# Patient Record
Sex: MALE | Race: WHITE | NOT HISPANIC OR LATINO | Employment: FULL TIME | URBAN - METROPOLITAN AREA
[De-identification: names, ages, dates, MRNs, and addresses within clinical notes are randomized per-mention and may not be internally consistent; named-entity substitution may affect disease eponyms.]

---

## 2023-11-16 ENCOUNTER — OFFICE VISIT (OUTPATIENT)
Dept: ORTHOPEDIC SURGERY | Facility: CLINIC | Age: 43
End: 2023-11-16
Payer: COMMERCIAL

## 2023-11-16 DIAGNOSIS — M65.4 DE QUERVAIN'S TENOSYNOVITIS: Primary | ICD-10-CM

## 2023-11-16 PROCEDURE — 1036F TOBACCO NON-USER: CPT | Performed by: ORTHOPAEDIC SURGERY

## 2023-11-16 PROCEDURE — 20600 DRAIN/INJ JOINT/BURSA W/O US: CPT | Performed by: ORTHOPAEDIC SURGERY

## 2023-11-16 PROCEDURE — 99213 OFFICE O/P EST LOW 20 MIN: CPT | Performed by: ORTHOPAEDIC SURGERY

## 2023-11-16 RX ORDER — IBUPROFEN 800 MG/1
800 TABLET ORAL EVERY 8 HOURS PRN
Qty: 90 TABLET | Refills: 1 | Status: SHIPPED | OUTPATIENT
Start: 2023-11-16 | End: 2023-12-16

## 2023-11-16 NOTE — LETTER
November 19, 2023     Magdy Crouch MD  [address]  [facility], [facility] [address]  [facility] [address]    Patient: Dav Fowler   YOB: 1980   Date of Visit: 11/16/2023       Dear Dr. Magdy Crouch MD:    Thank you for referring Dav Fowler to me for evaluation. Below are my notes for this consultation.  If you have questions, please do not hesitate to call me. I look forward to following your patient along with you.       Sincerely,     Rasheed Cottrell MD      CC: No Recipients  ______________________________________________________________________________________    CHIEF COMPLAINT         Right wrist pain       ASSESSMENT + PLAN    Right de Quervain's tendinitis, recurrent after remote injection    The nature of de Quervain's tendinitis was reviewed, along with the natural history.  I reviewed the options for management, including observation, nonsteroidals, splinting, oral steroids, cortisone injection, or surgical release, along with the likely success rates of each.  I reviewed the risks of cortisone injection, including infection, hypopigmentation, and fat atrophy.  The transient effect on blood glucose measurement was also reviewed, and the patient wished to proceed.    After sterile prep, I injected 20 mg of Kenalog and 0.5 cc of 1% lidocaine, plain to the right first dorsal compartment.    A prescription for Motrin 800 mg was also transmitted to his pharmacy of choice, the [facility] in [place].    Patient will followup in 4 weeks if still symptomatic, or with any concerns, and will continue with the brace and nonsteroidals as needed.        HISTORY OF PRESENT ILLNESS       Patient returns today, 9 months after last visit, with a recurrence of his right de Quervain's tendinitis.  He had excellent response to the previous injection and splint.  Symptoms have been back for about a month.  No interval trauma.  No numbness or tingling.  No popping, catching, or  instability.  No night pain or rest pain, just sharp activity pain at the radial styloid with pinching or grasping.  No similar problems on the left.  He is requesting another injection today.      PHYSICAL EXAM       He remains well-developed, well-nourished male in no acute distress.  He appears his stated age and has a pleasant affect.  Skin of the right hand and wrist remains intact with no erythema, ecchymosis, or diffuse swelling.  No cortisone stigmata.  Mild first dorsal compartment swelling and quite tender to palpation there.  Positive Finkelstein.  Positive wrist flexion-thumb extension test.  No triggering or CMC signs.  Sensation intact to light touch in all distributions.  Capillary refill less than 2 seconds.  Symmetric wrist and forearm motion.      IMAGING / LABS / EMGs        None today      PROCEDURE    Hand / UE Inj/Asp for de Quervain's tenosynovitis on 11/19/2023 4:21 PM  Indications: therapeutic  Details: 25 G needle, radial approach  Medications: 20 mg triamcinolone acetonide 40 mg/mL; 0.5 mL lidocaine 10 mg/mL (1 %)  Procedure, treatment alternatives, risks and benefits explained, specific risks discussed. Consent was given by the patient.             Electronically Signed      CORAL Cottrell MD      Orthopaedic Hand Surgery      [phone]

## 2023-11-19 PROBLEM — M65.4 DE QUERVAIN'S TENOSYNOVITIS: Status: ACTIVE | Noted: 2023-11-19

## 2023-11-19 RX ORDER — TRIAMCINOLONE ACETONIDE 40 MG/ML
20 INJECTION, SUSPENSION INTRA-ARTICULAR; INTRAMUSCULAR
Status: COMPLETED | OUTPATIENT
Start: 2023-11-19 | End: 2023-11-19

## 2023-11-19 RX ORDER — LIDOCAINE HYDROCHLORIDE 10 MG/ML
0.5 INJECTION INFILTRATION; PERINEURAL
Status: COMPLETED | OUTPATIENT
Start: 2023-11-19 | End: 2023-11-19

## 2023-11-19 RX ADMIN — LIDOCAINE HYDROCHLORIDE 0.5 ML: 10 INJECTION INFILTRATION; PERINEURAL at 16:21

## 2023-11-19 RX ADMIN — TRIAMCINOLONE ACETONIDE 20 MG: 40 INJECTION, SUSPENSION INTRA-ARTICULAR; INTRAMUSCULAR at 16:21

## 2023-11-19 NOTE — PROGRESS NOTES
CHIEF COMPLAINT         Right wrist pain       ASSESSMENT + PLAN    Right de Quervain's tendinitis, recurrent after remote injection    The nature of de Quervain's tendinitis was reviewed, along with the natural history.  I reviewed the options for management, including observation, nonsteroidals, splinting, oral steroids, cortisone injection, or surgical release, along with the likely success rates of each.  I reviewed the risks of cortisone injection, including infection, hypopigmentation, and fat atrophy.  The transient effect on blood glucose measurement was also reviewed, and the patient wished to proceed.    After sterile prep, I injected 20 mg of Kenalog and 0.5 cc of 1% lidocaine, plain to the right first dorsal compartment.    A prescription for Motrin 800 mg was also transmitted to his pharmacy of choice, the [facility] in [place].    Patient will followup in 4 weeks if still symptomatic, or with any concerns, and will continue with the brace and nonsteroidals as needed.        HISTORY OF PRESENT ILLNESS       Patient returns today, 9 months after last visit, with a recurrence of his right de Quervain's tendinitis.  He had excellent response to the previous injection and splint.  Symptoms have been back for about a month.  No interval trauma.  No numbness or tingling.  No popping, catching, or instability.  No night pain or rest pain, just sharp activity pain at the radial styloid with pinching or grasping.  No similar problems on the left.  He is requesting another injection today.      PHYSICAL EXAM       He remains well-developed, well-nourished male in no acute distress.  He appears his stated age and has a pleasant affect.  Skin of the right hand and wrist remains intact with no erythema, ecchymosis, or diffuse swelling.  No cortisone stigmata.  Mild first dorsal compartment swelling and quite tender to palpation there.  Positive Finkelstein.  Positive wrist flexion-thumb extension test.  No triggering or  CMC signs.  Sensation intact to light touch in all distributions.  Capillary refill less than 2 seconds.  Symmetric wrist and forearm motion.      IMAGING / LABS / EMGs        None today      PROCEDURE    Hand / UE Inj/Asp for de Quervain's tenosynovitis on 11/19/2023 4:21 PM  Indications: therapeutic  Details: 25 G needle, radial approach  Medications: 20 mg triamcinolone acetonide 40 mg/mL; 0.5 mL lidocaine 10 mg/mL (1 %)  Procedure, treatment alternatives, risks and benefits explained, specific risks discussed. Consent was given by the patient.             Electronically Signed      CORAL Cottrell MD      Orthopaedic Hand Surgery      [phone]

## 2024-08-13 ENCOUNTER — HOSPITAL ENCOUNTER (EMERGENCY)
Facility: HOSPITAL | Age: 44
Discharge: HOME | End: 2024-08-13
Payer: COMMERCIAL

## 2024-08-13 ENCOUNTER — APPOINTMENT (OUTPATIENT)
Dept: RADIOLOGY | Facility: HOSPITAL | Age: 44
End: 2024-08-13
Payer: COMMERCIAL

## 2024-08-13 VITALS
DIASTOLIC BLOOD PRESSURE: 60 MMHG | SYSTOLIC BLOOD PRESSURE: 122 MMHG | RESPIRATION RATE: 18 BRPM | TEMPERATURE: 98.4 F | HEART RATE: 68 BPM | WEIGHT: 185 LBS | OXYGEN SATURATION: 100 % | BODY MASS INDEX: 26.54 KG/M2

## 2024-08-13 DIAGNOSIS — S13.9XXA NECK SPRAIN, INITIAL ENCOUNTER: ICD-10-CM
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
## 2024-08-13 PROCEDURE — 99284 EMERGENCY DEPT VISIT MOD MDM: CPT

## 2024-08-13 PROCEDURE — 72125 CT NECK SPINE W/O DYE: CPT | Performed by: RADIOLOGY

## 2024-08-13 PROCEDURE — 2500000001 HC RX 250 WO HCPCS SELF ADMINISTERED DRUGS (ALT 637 FOR MEDICARE OP): Performed by: PHYSICIAN ASSISTANT

## 2024-08-13 PROCEDURE — 72125 CT NECK SPINE W/O DYE: CPT

## 2024-08-13 RX ORDER — METHOCARBAMOL 500 MG/1
500 TABLET, FILM COATED ORAL EVERY 12 HOURS PRN
Qty: 20 TABLET | Refills: 0 | Status: SHIPPED | OUTPATIENT
Start: 2024-08-13

## 2024-08-13 RX ORDER — NAPROXEN 500 MG/1
500 TABLET ORAL ONCE
Status: COMPLETED | OUTPATIENT
Start: 2024-08-13 | End: 2024-08-13

## 2024-08-13 RX ORDER — NAPROXEN 500 MG/1
500 TABLET ORAL EVERY 12 HOURS
Qty: 20 TABLET | Refills: 0 | Status: SHIPPED | OUTPATIENT
Start: 2024-08-13

## 2024-08-13 ASSESSMENT — COLUMBIA-SUICIDE SEVERITY RATING SCALE - C-SSRS
1. IN THE PAST MONTH, HAVE YOU WISHED YOU WERE DEAD OR WISHED YOU COULD GO TO SLEEP AND NOT WAKE UP?: NO
6. HAVE YOU EVER DONE ANYTHING, STARTED TO DO ANYTHING, OR PREPARED TO DO ANYTHING TO END YOUR LIFE?: NO
2. HAVE YOU ACTUALLY HAD ANY THOUGHTS OF KILLING YOURSELF?: NO

## 2024-08-13 ASSESSMENT — PAIN - FUNCTIONAL ASSESSMENT
PAIN_FUNCTIONAL_ASSESSMENT: 0-10
PAIN_FUNCTIONAL_ASSESSMENT: 0-10

## 2024-08-13 ASSESSMENT — PAIN SCALES - GENERAL
PAINLEVEL_OUTOF10: 8
PAINLEVEL_OUTOF10: 4

## 2024-08-13 NOTE — ED NOTES
Pt removed his C-collar, states that it made his pain worse.      Darshan Dupont RN  08/13/24 [id number]
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
## 2024-08-21 ENCOUNTER — OFFICE VISIT (OUTPATIENT)
Dept: PRIMARY CARE | Facility: CLINIC | Age: 44
End: 2024-08-21
Payer: COMMERCIAL

## 2024-08-21 VITALS — DIASTOLIC BLOOD PRESSURE: 73 MMHG | SYSTOLIC BLOOD PRESSURE: 123 MMHG

## 2024-08-21 DIAGNOSIS — M99.01 CERVICAL (NECK) REGION SOMATIC DYSFUNCTION: Primary | ICD-10-CM

## 2024-08-21 DIAGNOSIS — R03.0 BLOOD PRESSURE ELEVATED WITHOUT HISTORY OF HTN: ICD-10-CM

## 2024-08-21 PROCEDURE — 99213 OFFICE O/P EST LOW 20 MIN: CPT | Performed by: INTERNAL MEDICINE
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
## 2024-09-12 ENCOUNTER — TELEPHONE (OUTPATIENT)
Dept: PRIMARY CARE | Facility: CLINIC | Age: 44
End: 2024-09-12

## 2024-09-13 ENCOUNTER — APPOINTMENT (OUTPATIENT)
Dept: PRIMARY CARE | Facility: CLINIC | Age: 44
End: 2024-09-13
Payer: COMMERCIAL

## 2024-09-27 ENCOUNTER — APPOINTMENT (OUTPATIENT)
Dept: PHYSICAL THERAPY | Facility: CLINIC | Age: 44
End: 2024-09-27
Payer: COMMERCIAL